# Patient Record
Sex: MALE | Race: WHITE
[De-identification: names, ages, dates, MRNs, and addresses within clinical notes are randomized per-mention and may not be internally consistent; named-entity substitution may affect disease eponyms.]

---

## 2019-08-04 NOTE — REP
Clinical:  Pain.

 

Technique:  AP and angled views of the left clavicle.

 

Findings:

Sternoclavicular and acromioclavicular joints are intact and normal.  The

clavicle is intact without fracture.  Surrounding soft tissues and osseous

structures are normal.  Calcified miliary nodules within the visualized lung

noted.

 

Impression:

Normal clavicle.

Calcified miliary nodules within the visualized left upper lung zone.

 

 

Electronically Signed by

Ghulam Saravia MD 08/04/2019 09:06 A

## 2019-08-04 NOTE — REP
Clinical:  Left posterior shoulder pain .

 

Technique:  Internal rotation, external rotation, and Y view left shoulder .

 

Findings:

No acute fracture or dislocation.  The acromioclavicular and glenohumeral joints

are intact.  No periarticular calcifications or degenerative changes are

appreciated.  Sub acromial space is normal.  Surrounding soft tissues are

unremarkable.  Presumed chronic calcified miliary nodules within the visualized

left lung.

 

Impression:

Normal left shoulder radiographs.

 

Presumed chronic calcified miliary nodules within the visualized left lung.

 

 

Electronically Signed by

Ghulam Saravia MD 08/04/2019 09:09 A

## 2019-11-20 ENCOUNTER — HOSPITAL ENCOUNTER (OUTPATIENT)
Dept: HOSPITAL 53 - M RADPRO | Age: 34
End: 2019-11-20
Attending: ORTHOPAEDIC SURGERY
Payer: COMMERCIAL

## 2019-11-20 DIAGNOSIS — M75.92: ICD-10-CM
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
## 2019-11-20 DIAGNOSIS — S43.109A: ICD-10-CM

## 2019-11-20 DIAGNOSIS — M25.512: Primary | ICD-10-CM
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
## 2019-11-20 NOTE — REP
MRI LEFT SHOULDER:

 

TECHNIQUE:  Axial T2 fat sat, gradient echo, sagittal oblique T2 fat sat, coronal

oblique T1, T2 fat sat.

 

There is mild ill-defined high signal in the supraspinatus tendon compatible with

mild tendinopathy/tendinitis.  No rotator cuff tendon tear is seen.  There are

mild hypertrophic degenerative changes of the acromioclavicular joint with mild

subchondral marrow edema and cystic changes of the distal clavicle.  Acromion is

type 2.  Biceps tendon is within the bicipital groove with no tenosynovitis.

There is no Hill-Sachs deformity.  The deltoid muscle demonstrates no abnormal

signal.  There does appear to be fraying at the biceps labral complex.  Otherwise

no definite labral tear is seen.  A few tiny subcortical cysts are seen in the

lateral humeral head.  There is a normal amount of joint fluid.  No paralabral

cyst is seen.

 

IMPRESSION:

 

Patient refused the scheduled arthrogram procedure.

 

There is mild supraspinatus tendinopathy/tendinitis with no evidence of rotator

cuff tendon tear.  There are mild hypertrophic degenerative changes

acromioclavicular joint with a type 2 acromion.  There is fraying of the biceps

labral complex.  Otherwise no definite labral tear.  A few tiny subcortical cysts

lateral humeral head.

 

 

Electronically Signed by

Miguel Angel Lopez MD 11/21/2019 11:17 A

## 2019-12-03 ENCOUNTER — HOSPITAL ENCOUNTER (OUTPATIENT)
Dept: HOSPITAL 53 - M LRY | Age: 34
End: 2019-12-03
Attending: FAMILY MEDICINE
Payer: COMMERCIAL

## 2019-12-03 DIAGNOSIS — Z53.9: Primary | ICD-10-CM

## 2019-12-03 DIAGNOSIS — R91.8: Primary | ICD-10-CM

## 2019-12-03 NOTE — REP
Clinical:  Lung nodules.

 

Technique:  PA and lateral.

 

Comparison:  None available.

 

Findings:

Innumerable miliary nodules are noted throughout the bilateral lung fields likely

representing calcified granulomata.  Differential diagnosis would include

sequelae from prior granulomas disease as well as prior metastatic disease

including thyroid and/or testicular malignancy.

 

Mediastinum and cardiac silhouette normal.  No focal consolidation.  No effusion.

No pneumothorax.  Skeletal structures intact.

 

Impression:

Diffuse miliary nodules likely calcified.  No prior examination available for

comparison.

 

 

Electronically Signed by

Ghulam Saravia MD 12/03/2019 10:30 A

## 2019-12-23 ENCOUNTER — HOSPITAL ENCOUNTER (OUTPATIENT)
Dept: HOSPITAL 53 - M RAD | Age: 34
End: 2019-12-23
Attending: FAMILY MEDICINE
Payer: COMMERCIAL

## 2019-12-23 DIAGNOSIS — R91.8: Primary | ICD-10-CM

## 2019-12-23 NOTE — REP
Clinical:  Follow-up pulmonary nodule.

 

Technique:  Axial noncontrast images from the thoracic inlet to the upper abdomen

with coronal and sagittal re-formations.

 

Findings:

Innumerable scattered bilateral calcified nodules measure up to approximately 3

mm and findings are most consistent with sequelae of prior granulomatous disease.

No significant nodule or mass lesion.  No consolidation.  No effusion.  There is

a small subtle 2 cm ground-glass non solid density along the posterior periphery

of the right lower lobe (image 52) which is otherwise nonspecific.  No

significant adenopathy.  Mediastinum demonstrates normal thoracic aorta,

pulmonary vasculature and heart/pericardium.  Surrounding musculoskeletal

structures are intact.  Limited upper abdomen demonstrates normal bilateral

adrenal glands.

 

Impression:

1.  Calcified granulomata consistent with prior granulomatous disease.

2.  2 cm non solid ground-glass density in the posterior right lower lobe is

otherwise nonspecific.  Consider reevaluation in 6-9 months.

 

 

Electronically Signed by

Ghulam Saravia MD 12/23/2019 08:26 A

## 2019-12-27 ENCOUNTER — HOSPITAL ENCOUNTER (OUTPATIENT)
Dept: HOSPITAL 53 - M SFHCLERA | Age: 34
End: 2019-12-27
Attending: FAMILY MEDICINE
Payer: COMMERCIAL

## 2019-12-27 DIAGNOSIS — R93.89: Primary | ICD-10-CM

## 2019-12-27 DIAGNOSIS — J84.10: ICD-10-CM

## 2019-12-27 LAB
ALBUMIN SERPL BCG-MCNC: 4.2 GM/DL (ref 3.2–5.2)
ALT SERPL W P-5'-P-CCNC: 47 U/L (ref 12–78)
APPEARANCE UR: CLEAR
BACTERIA UR QL AUTO: NEGATIVE
BASOPHILS # BLD AUTO: 0 10^3/UL (ref 0–0.2)
BASOPHILS NFR BLD AUTO: 0.6 % (ref 0–1)
BILIRUB SERPL-MCNC: 0.6 MG/DL (ref 0.2–1)
BILIRUB UR QL STRIP.AUTO: NEGATIVE
BUN SERPL-MCNC: 16 MG/DL (ref 7–18)
CALCIUM SERPL-MCNC: 9 MG/DL (ref 8.5–10.1)
CHLORIDE SERPL-SCNC: 108 MEQ/L (ref 98–107)
CO2 SERPL-SCNC: 24 MEQ/L (ref 21–32)
CREAT SERPL-MCNC: 1.3 MG/DL (ref 0.7–1.3)
CRP SERPL-MCNC: < 0.3 MG/DL (ref 0–0.3)
EOSINOPHIL # BLD AUTO: 0.1 10^3/UL (ref 0–0.5)
EOSINOPHIL NFR BLD AUTO: 1.9 % (ref 0–3)
ERYTHROCYTE [SEDIMENTATION RATE] IN BLOOD BY WESTERGREN METHOD: 2 MM/HR (ref 0–15)
GFR SERPL CREATININE-BSD FRML MDRD: > 60 ML/MIN/{1.73_M2} (ref 60–?)
GLUCOSE SERPL-MCNC: 118 MG/DL (ref 70–100)
GLUCOSE UR QL STRIP.AUTO: NEGATIVE MG/DL
HCT VFR BLD AUTO: 45.6 % (ref 42–52)
HGB BLD-MCNC: 15.3 G/DL (ref 13.5–17.5)
HGB UR QL STRIP.AUTO: NEGATIVE
HIV 1+2 AB+HIV1 P24 AG SERPL QL IA: NEGATIVE
KETONES UR QL STRIP.AUTO: (no result) MG/DL
LEUKOCYTE ESTERASE UR QL STRIP.AUTO: NEGATIVE
LYMPHOCYTES # BLD AUTO: 1.9 10^3/UL (ref 1.5–5)
LYMPHOCYTES NFR BLD AUTO: 37.7 % (ref 24–44)
MCH RBC QN AUTO: 29.6 PG (ref 27–33)
MCHC RBC AUTO-ENTMCNC: 33.6 G/DL (ref 32–36.5)
MCV RBC AUTO: 88.2 FL (ref 80–96)
MONOCYTES # BLD AUTO: 0.4 10^3/UL (ref 0–0.8)
MONOCYTES NFR BLD AUTO: 7.2 % (ref 0–5)
NEUTROPHILS # BLD AUTO: 2.7 10^3/UL (ref 1.5–8.5)
NEUTROPHILS NFR BLD AUTO: 51.8 % (ref 36–66)
NITRITE UR QL STRIP.AUTO: NEGATIVE
PH UR STRIP.AUTO: 5 UNITS (ref 5–9)
PLATELET # BLD AUTO: 201 10^3/UL (ref 150–450)
POTASSIUM SERPL-SCNC: 3.9 MEQ/L (ref 3.5–5.1)
PROT SERPL-MCNC: 7.4 GM/DL (ref 6.4–8.2)
PROT UR QL STRIP.AUTO: NEGATIVE MG/DL
RBC # BLD AUTO: 5.17 10^6/UL (ref 4.3–6.1)
RBC # UR AUTO: 0 /HPF (ref 0–3)
SODIUM SERPL-SCNC: 140 MEQ/L (ref 136–145)
SP GR UR STRIP.AUTO: 1.02 (ref 1–1.03)
SQUAMOUS #/AREA URNS AUTO: 0 /HPF (ref 0–6)
UROBILINOGEN UR QL STRIP.AUTO: 0.2 MG/DL (ref 0–2)
WBC # BLD AUTO: 5.2 10^3/UL (ref 4–10)
WBC #/AREA URNS AUTO: 0 /HPF (ref 0–3)

## 2019-12-30 ENCOUNTER — HOSPITAL ENCOUNTER (OUTPATIENT)
Dept: HOSPITAL 53 - M CARPUL | Age: 34
End: 2019-12-30
Attending: FAMILY MEDICINE
Payer: COMMERCIAL

## 2019-12-30 DIAGNOSIS — R91.8: Primary | ICD-10-CM

## 2019-12-30 NOTE — PFTRPT
Site: [facility], [address] [place], [place], [address]

ID: [id number]  Name: GUALBERTO MARINELLI

Visit Date: 2019  Second ID: [id number]

Referring Doctor: JOANNA DALLAS DO

Reviewing Doctor: Siva Elizalde MD

Technician: ALINA HARRIS RRT

Age: 34  : 1985  Sex: Male  Race: 

Height: 66.00  Inches  Weight: 220.00  Lbs  BSA: 2.08

Order IDs: [id number]

Requested Test(s): <RESP-PFT.DLCO>

Diagnosis: R91.8



test meet the ATS standards for acceptability and repeatability.  Pt was given 

four puffs of albuterol for postbronchodilator.

Review Status: Not Reviewed

 

                                        Pre-Bronch    Post-Bronch

                                 Pred  Actual %Pred  Actual %Chng

SPIROMETRY

FVC (L)                          4.73   4.82    101   4.66     -3 

FEV1 (L)                         3.83   4.38    114   4.24     -3 

FEV1/FVC (%)                       81     91    112     91        

FEF 25% (L/sec)                  8.26  10.70    129  10.21     -4 

FEF 50% (L/sec)                  5.92   8.31    140   8.31        

FEF 75% (L/sec)                  2.03   3.31    162   2.86    -13 

FEF 25-75% (L/sec)               3.82   6.63    173   6.39     -3 

FEF Max (L/sec)                  9.36  11.23    119  10.83     -3 

FIVC (L)                                4.93          4.89        

FIF 50% (L/sec)                  5.35  10.33    192  11.00      6 

FIF Max (L/sec)                        10.61         11.02      3 

MVV (L/min)                       157    165    105               

Expiratory Time (sec)                   6.67          6.82      2 

Back Extrap Vol (L)                     0.13          0.17     32 

Time To FEFmax (sec)                   0.060         0.086     42 

LUNG VOLUMES

SVC (L)                          4.59   4.88    106               

IC (L)                           3.17   3.41    107               

ERV (L)                          1.42   1.47    103               

TGV (L)                          2.94   3.08    104               

RV (Pleth) (L)                   1.52   1.61    106               

TLC (Pleth) (L)                  6.11   6.49    106               

RV/TLC (Pleth) (%)                 25     25     99               

DIFFUSION

DLCOunc (ml/min/mmHg)           32.32  29.93     92               

DL/VA (ml/min/mmHg/L)            5.29   4.97     94               

VA (L)                           6.11   6.02     98               

BHT (sec)                               9.76                      

IVC (L)                                 4.64                      

TLC (SB) (L)                            6.17                      

AIRWAYS RESISTANCE

Raw (cmH2O/L/s)                  1.45   0.54     36               

Gaw (L/s/cmH2O)                  1.03   1.88    182               

sRaw (cmH2O*s)                   4.76   1.58     33               

sGaw (1/cmH2O*s)                 0.20   0.65    323

## 2020-09-15 ENCOUNTER — HOSPITAL ENCOUNTER (OUTPATIENT)
Dept: HOSPITAL 53 - M WUC | Age: 35
End: 2020-09-15
Attending: PHYSICIAN ASSISTANT
Payer: COMMERCIAL
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
## 2020-09-15 DIAGNOSIS — S20.212A: Primary | ICD-10-CM
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
## 2020-09-30 NOTE — REP
LEFT RIB SERIES: 09/15/20



CLINICAL: Contusion.



TECHNIQUE: Frontal view of the chest with multiple views of the left hemithorax.




FINDINGS:

Frontal view of the chest demonstrates diffuse miliary nodules which require 
clinical/historical correlation. Multiple views of the left hemithorax 
demonstrates no definite acute displaced rib fracture.



IMPRESSION:

1. Diffuse bilateral miliary nodules requiring correlation. Findings are 
relatively unchanged compared to 12/03/19.

2. No obvious left rib fracture.

MTDD

## 2021-01-05 ENCOUNTER — HOSPITAL ENCOUNTER (EMERGENCY)
Dept: HOSPITAL 53 - M ED | Age: 36
Discharge: HOME | End: 2021-01-05
Payer: COMMERCIAL

## 2021-01-05 VITALS
BODY MASS INDEX: 31.59 KG/M2 | DIASTOLIC BLOOD PRESSURE: 97 MMHG | WEIGHT: 208.45 LBS | HEIGHT: 68 IN | SYSTOLIC BLOOD PRESSURE: 154 MMHG

## 2021-01-05 DIAGNOSIS — L03.115: ICD-10-CM

## 2021-01-05 DIAGNOSIS — L30.9: Primary | ICD-10-CM

## 2021-01-05 LAB
ALBUMIN SERPL BCG-MCNC: 4.1 GM/DL (ref 3.2–5.2)
ALT SERPL W P-5'-P-CCNC: 32 U/L (ref 12–78)
BASOPHILS # BLD AUTO: 0 10^3/UL (ref 0–0.2)
BASOPHILS NFR BLD AUTO: 0.5 % (ref 0–1)
BILIRUB CONJ SERPL-MCNC: 0.1 MG/DL (ref 0–0.2)
BILIRUB SERPL-MCNC: 0.6 MG/DL (ref 0.2–1)
BUN SERPL-MCNC: 20 MG/DL (ref 7–18)
CALCIUM SERPL-MCNC: 9 MG/DL (ref 8.5–10.1)
CHLORIDE SERPL-SCNC: 111 MEQ/L (ref 98–107)
CO2 SERPL-SCNC: 26 MEQ/L (ref 21–32)
CREAT SERPL-MCNC: 1.07 MG/DL (ref 0.7–1.3)
CRP SERPL-MCNC: < 0.3 MG/DL (ref 0–0.3)
EOSINOPHIL # BLD AUTO: 0.2 10^3/UL (ref 0–0.5)
EOSINOPHIL NFR BLD AUTO: 2.9 % (ref 0–3)
ERYTHROCYTE [SEDIMENTATION RATE] IN BLOOD BY WESTERGREN METHOD: 1 MM/HR (ref 0–15)
GFR SERPL CREATININE-BSD FRML MDRD: > 60 ML/MIN/{1.73_M2} (ref 60–?)
GLUCOSE SERPL-MCNC: 108 MG/DL (ref 70–100)
HCT VFR BLD AUTO: 46 % (ref 42–52)
HGB BLD-MCNC: 15.8 G/DL (ref 13.5–17.5)
LIPASE SERPL-CCNC: 157 U/L (ref 73–393)
LYMPHOCYTES # BLD AUTO: 1.9 10^3/UL (ref 1.5–5)
LYMPHOCYTES NFR BLD AUTO: 32.5 % (ref 24–44)
MCH RBC QN AUTO: 30.3 PG (ref 27–33)
MCHC RBC AUTO-ENTMCNC: 34.3 G/DL (ref 32–36.5)
MCV RBC AUTO: 88.3 FL (ref 80–96)
MONOCYTES # BLD AUTO: 0.4 10^3/UL (ref 0–0.8)
MONOCYTES NFR BLD AUTO: 7.1 % (ref 0–5)
NEUTROPHILS # BLD AUTO: 3.3 10^3/UL (ref 1.5–8.5)
NEUTROPHILS NFR BLD AUTO: 56.7 % (ref 36–66)
PLATELET # BLD AUTO: 175 10^3/UL (ref 150–450)
POTASSIUM SERPL-SCNC: 3.7 MEQ/L (ref 3.5–5.1)
PROT SERPL-MCNC: 6.9 GM/DL (ref 6.4–8.2)
RBC # BLD AUTO: 5.21 10^6/UL (ref 4.3–6.1)
SODIUM SERPL-SCNC: 143 MEQ/L (ref 136–145)
WBC # BLD AUTO: 5.9 10^3/UL (ref 4–10)

## 2021-01-05 NOTE — REP
INDICATION:

infection.



COMPARISON:

None.



TECHNIQUE:

Four views.



FINDINGS:

Four views of the right foot demonstrate overall normal mineralization..  No fracture

or subluxation is seen.  No opaque foreign body noted.



No soft tissue gas is seen.  No acute bony erosive changes appreciated.



IMPRESSION:

Negative radiographs of the right foot..







<Electronically signed by Michael Turenr > 01/05/21 [id number]

## 2021-01-07 ENCOUNTER — HOSPITAL ENCOUNTER (OUTPATIENT)
Dept: HOSPITAL 53 - M LAB REF | Age: 36
End: 2021-01-07
Attending: PHYSICIAN ASSISTANT
Payer: COMMERCIAL

## 2021-01-07 DIAGNOSIS — R21: Primary | ICD-10-CM

## 2023-05-22 ENCOUNTER — HOSPITAL ENCOUNTER (EMERGENCY)
Dept: HOSPITAL 53 - M ED | Age: 38
Discharge: HOME | End: 2023-05-22
Payer: COMMERCIAL

## 2023-05-22 VITALS — WEIGHT: 209.44 LBS | HEIGHT: 68 IN | BODY MASS INDEX: 31.74 KG/M2

## 2023-05-22 VITALS — DIASTOLIC BLOOD PRESSURE: 105 MMHG | SYSTOLIC BLOOD PRESSURE: 163 MMHG
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
## 2023-05-22 DIAGNOSIS — S01.112A: ICD-10-CM

## 2023-05-22 DIAGNOSIS — S02.2XXA: ICD-10-CM

## 2023-05-22 DIAGNOSIS — S05.11XA: ICD-10-CM

## 2023-05-22 DIAGNOSIS — S01.511A: Primary | ICD-10-CM

## 2023-05-22 DIAGNOSIS — Z79.2: ICD-10-CM
